# Patient Record
(demographics unavailable — no encounter records)

---

## 2024-10-11 NOTE — ASSESSMENT
[FreeTextEntry1] : 69 yo man with abnormal CT chest  Smoker for many years 1 1/2 ppd until Jan 2024 Known COPD on Trelegy for two years , saw pulmonary in past  Had possible pneumonia in May Kristin, didnt get antibiotics Sent for LCS in July  Reviewed: Posterior CAROLYN infiltrate Then had f/u CT chest in September  Reviewed--Better infiltrate in CAROLYN posterior, but ?new infiltrate in anterior CAROLYN No mass though  Patient had been losing weight Given antibiotics last few weeks and is now finished--he is feeling better AND gaining weight   Hx AAA repair and then bowel surgery for ischemic colitis  with colostomy in 2023, reversal in Jan 2024  Imp 69 yo man, exsmoker with known  bullous COPD on Trelegy Recent pneumonia, CAROLYN posterior and then new infiltrate in anterior CAROLYN Finishing antibiotics Obtain convalescent CT in three months Return for PFTs at that time Continue BD regimen Happily , he is not smoking !!!

## 2024-10-11 NOTE — HISTORY OF PRESENT ILLNESS
[TextBox_4] : 71 yo man with abnormal CT chest  Smoker for many years 1 1/2 ppd until Jan 2024 Known COPD on Trelegy for two years , saw pulmonary in past  Had possible pneumonia in May Kristin, didnt get antibiotics Sent for LCS in July  Reviewed: Posterior CAROLYN infiltrate Then had f/u CT chest in September  Reviewed--Better infiltrate in CAROLYN posterior, but ?new infiltrate in anterior CAROLYN No mass though  Patient had been losing weight Given antibiotics last few weeks and is now finished--he is feeling better AND gaining weight   Hx AAA repair and then bowel surgery for ischemic colitis  with colostomy in 2023, reversal in Jan 2024

## 2024-10-11 NOTE — CONSULT LETTER
[Dear  ___] : Dear  [unfilled], [FreeTextEntry1] : I had the pleasure of evaluating your patient, PRIYA TAVAREZ , in the office today.  Please review my consultation and evaluation report that follows below.  Please do not hesitate to call me if further information is necessary or if you wish to discuss ongoing care or diagnostic work-up.    I very much appreciate your referral and it is a privilege to be able to provide care for your patient.  Sincerely,   Rito Fuentes MD, MHCM, FACP, JUAN-C Pulmonary Medicine  of Medicine Adelso chhaya Pillai [facility] at [facility] [email or web address] -Pulmonary in [address] [place]  [address]    Fax   Multi-Specialties at [address]  [phone]

## 2024-12-20 NOTE — HISTORY OF PRESENT ILLNESS
[TextBox_4] : 71 yo man with abnormal CT chest Smoker for many years 1 1/2 ppd until Jan 2024 Known COPD on Trelegy for two years Hx AAA repair and then bowel surgery for ischemic colitis with colostomy in 2023, reversal in Jan 2024. , saw pulmonary in past Had possible pneumonia in May Kristin, didnt get antibiotics Sent for LCS in July Posterior CAROLYN infiltrate Then had f/u CT chest in September Reviewed--Better infiltrate in CAROLYN posterior, but ?new infiltrate in anterior CAROLYN RUL was clear  Interim: Patient had foot infection, given cephalexin and neopsporin and developed signficant skin reaction, requiring DERM and oral steroids Finally getting better Breathing is better!  Has gained some more weight, not smoking  Returns after followup CT: Reviewed from [place]: Further resolution of CAROLYN anterior and posterior infiltrates NEW 8 mm RUL lesion ( near area of infiltrate No lesion was noted on either the July or Sept CTs Extensive, severe bullous changes and emphysema as before

## 2024-12-20 NOTE — PROCEDURE
[FreeTextEntry1] : PFTs SEVERE OBSTRUCTION WITH fev1 0.94 AND fev1% 45% Elevation of TLC and FRC consistent with signficant airtrapping Decrease DLCO 50% c/w emphysema

## 2024-12-20 NOTE — CONSULT LETTER
[Dear  ___] : Dear  [unfilled], [FreeTextEntry1] : I had the pleasure of evaluating your patient, PRIYA TAVAREZ , in the office today.  Please review my consultation and evaluation report that follows below.  Please do not hesitate to call me if further information is necessary or if you wish to discuss ongoing care or diagnostic work-up.    I very much appreciate your referral and it is a privilege to be able to provide care for your patient.  Sincerely,   Rito Fuentes MD, MHCM, FACP, JUAN-C Pulmonary Medicine  of Medicine Adelso chhaya Pillai [facility] at [facility] [place]/[facility] [email or web address] -Pulmonary in [address] [place]  [address]    Fax   Multi-Specialties at [address]  [phone]

## 2024-12-20 NOTE — ASSESSMENT
[FreeTextEntry1] : 69 yo man with abnormal CT chest Smoker for many years 1 1/2 ppd until Jan 2024 Known COPD on Trelegy for two years Hx AAA repair and then bowel surgery for ischemic colitis with colostomy in 2023, reversal in Jan 2024. , saw pulmonary in past Had possible pneumonia in May Kristin, didnt get antibiotics Sent for LCS in July Posterior CAROLYN infiltrate Then had f/u CT chest in September Reviewed--Better infiltrate in CAROLYN posterior, but ?new infiltrate in anterior CAROLYN RUL was clear  Interim: Patient had foot infection, given cephalexin and neopsporin and developed signficant skin reaction, requiring DERM and oral steroids Finally getting better Breathing is better!  Has gained some more weight, not smoking  Returns after followup CT: Reviewed from [place]: Further resolution of CAROLYN anterior and posterior infiltrates NEW 8 mm RUL lesion ( near area of infiltrate No lesion was noted on either the July or Sept CTs Extensive, severe bullous changes and emphysema as before  Imp[ 69 yo man with severe COPD with emphysema, no hypoxemia at rest PFTs show FEV1 less than one liter with changes of airtrapping and emphysema Continue Trelelgy and albuterol PRN Vaccination with flu, covid and RSV recommended He does NOT wish to get RSV or covid but may get flu  Resolved pneumonia on left side Possible new nodule in RUL but nothing was seen on two recent CTs this year Doubt neoplasm but cannot rule out Recommend six month followup  RTC three months

## 2025-03-14 NOTE — ASSESSMENT
[FreeTextEntry1] :      72 yo man with abnormal CT chest Smoker for many years 1 1/2 ppd until Jan 2024 Known COPD on Trelegy for two years Hx AAA repair and then bowel surgery for ischemic colitis with colostomy in 2023, reversal in Jan 2024. , saw pulmonary in past Had possible pneumonia in May Kristin, didnt get antibiotics Sent for LCS in July Posterior CAROLYN infiltrate Then had f/u CT chest in September Reviewed--Better infiltrate in CAROLYN posterior, but ?new infiltrate in anterior CAROLYN RUL was clear Followup CT: in Nov Further resolution of CAROLYN anterior and posterior infiltrates NEW 8 mm RUL lesion ( near area of infiltrate   0 No lesion was noted on either the July or Sept CTs Extensive, severe bullous changes and emphysema as before.  Interim: A repeat CT was ordered for six months but had repeat  in March 2025  Reviewed with patient: The new Infiltrates in RUL were no longer seen There was a new, more posterior and infiltrate identified in RUL which was not present earlier This again was felt to be inflammatory in nature  Clinically, patient in good spirits, not dyspneic and taking his trelegy daily Imp 72 yo man with abnormal CT chest Smoker for many years 1 1/2 ppd until Jan 2024 Known COPD on Trelegy for two years Hx AAA repair and then bowel surgery for ischemic colitis with colostomy in 2023, reversal in Jan 2024.  New and resolving infiltrates in RUL likely inflammatory in setting of COPD with emphysema Doing well however o Trelegy Recommend f/u CT in September and f/u here

## 2025-03-14 NOTE — CONSULT LETTER
[Dear  ___] : Dear  [unfilled], [FreeTextEntry1] : I had the pleasure of evaluating your patient, PRIYA TAVAREZ , in the office today.  Please review my consultation and evaluation report that follows below.  Please do not hesitate to call me if further information is necessary or if you wish to discuss ongoing care or diagnostic work-up.    I very much appreciate your referral and it is a privilege to be able to provide care for your patient.  Sincerely,   Rito Fuentes MD, MHCM, FACP, JUAN-C Pulmonary Medicine  of Medicine Adelso chhaya Pillai [facility] at [facility] [email or web address] -Pulmonary in [address] [place]  [address]    Fax   Multi-Specialties at [address]  [phone]

## 2025-03-14 NOTE — END OF VISIT
[Time Spent: ___ minutes] : I have spent [unfilled] minutes of time on the encounter which excludes teaching and separately reported services. If you are a smoker, it is important for your health to stop smoking. Please be aware that second hand smoke is also harmful.

## 2025-03-14 NOTE — HISTORY OF PRESENT ILLNESS
[TextBox_4] :      72 yo man with abnormal CT chest Smoker for many years 1 1/2 ppd until Jan 2024 Known COPD on Trelegy for two years Hx AAA repair and then bowel surgery for ischemic colitis with colostomy in 2023, reversal in Jan 2024. , saw pulmonary in past Had possible pneumonia in May Kristin, didnt get antibiotics Sent for LCS in July Posterior CAROLYN infiltrate Then had f/u CT chest in September Reviewed--Better infiltrate in CAROLYN posterior, but ?new infiltrate in anterior CAROLYN RUL was clear Followup CT: in Nov Further resolution of CAROLYN anterior and posterior infiltrates NEW 8 mm RUL lesion ( near area of infiltrate No lesion was noted on either the July or Sept CTs Extensive, severe bullous changes and emphysema as before.  Interim: A repeat CT was ordered for six months but had repeat  in March 2025  Reviewed with patient: The new Infiltrates in RUL were no longer seen There was a new, more posterior and infiltrate identified in RUL which was not present earlier This again was felt to be inflammatory in natere  Clinically, patient in good spirits, not dyspneic and taking his trelegy daily